# Patient Record
Sex: FEMALE | Race: WHITE | NOT HISPANIC OR LATINO | URBAN - METROPOLITAN AREA
[De-identification: names, ages, dates, MRNs, and addresses within clinical notes are randomized per-mention and may not be internally consistent; named-entity substitution may affect disease eponyms.]

---

## 2020-05-11 ENCOUNTER — TELEPHONE (OUTPATIENT)
Dept: FAMILY MEDICINE | Facility: CLINIC | Age: 60
End: 2020-05-11

## 2020-05-11 NOTE — TELEPHONE ENCOUNTER
----- Message from Alexus Stone sent at 5/11/2020  9:34 AM CDT -----  Contact: [phone]-  Patient is requesting a call back concerning scheduling a New Pt appt for needing a referral to be seen by a Dermatologist. Please call

## 2020-05-11 NOTE — TELEPHONE ENCOUNTER
I spoke with the pt   She is new to [facility] and needs a referral to dermatology      appt scheduled for tomorrow

## 2020-05-12 ENCOUNTER — OFFICE VISIT (OUTPATIENT)
Dept: FAMILY MEDICINE | Facility: CLINIC | Age: 60
End: 2020-05-12
Payer: OTHER GOVERNMENT

## 2020-05-12 VITALS
WEIGHT: 157.19 LBS | BODY MASS INDEX: 24.67 KG/M2 | OXYGEN SATURATION: 100 % | HEART RATE: 120 BPM | HEIGHT: 67 IN | TEMPERATURE: 98 F

## 2020-05-12 DIAGNOSIS — E78.5 HYPERLIPIDEMIA, UNSPECIFIED HYPERLIPIDEMIA TYPE: ICD-10-CM

## 2020-05-12 DIAGNOSIS — R73.9 HYPERGLYCEMIA: ICD-10-CM

## 2020-05-12 DIAGNOSIS — D22.9 NUMEROUS MOLES: ICD-10-CM

## 2020-05-12 DIAGNOSIS — Z00.00 ROUTINE HEALTH MAINTENANCE: Primary | ICD-10-CM

## 2020-05-12 PROCEDURE — 99386 PR PREVENTIVE VISIT,NEW,40-64: ICD-10-PCS | Mod: S$GLB,,, | Performed by: FAMILY MEDICINE

## 2020-05-12 PROCEDURE — 99386 PREV VISIT NEW AGE 40-64: CPT | Mod: S$GLB,,, | Performed by: FAMILY MEDICINE

## 2020-05-12 RX ORDER — BLOOD-GLUCOSE METER
KIT MISCELLANEOUS
COMMUNITY
Start: 2020-04-08

## 2020-05-12 RX ORDER — UBIDECARENONE 30 MG
30 CAPSULE ORAL 3 TIMES DAILY
COMMUNITY

## 2020-05-12 RX ORDER — LANCETS 28 GAUGE
EACH MISCELLANEOUS
COMMUNITY
Start: 2020-04-08

## 2020-05-14 ENCOUNTER — TELEPHONE (OUTPATIENT)
Dept: FAMILY MEDICINE | Facility: CLINIC | Age: 60
End: 2020-05-14

## 2020-05-14 NOTE — TELEPHONE ENCOUNTER
I spoke with the pt    I resent the [facility] referral for derm / dr verde    To retro the approval - jan 1, 2020

## 2020-05-14 NOTE — TELEPHONE ENCOUNTER
----- Message from Shanda Gardner sent at 5/14/2020 10:31 AM CDT -----  Contact: [phone]/ L[place]  Patient's  Griffin is requesting a call back regarding her referral to a dermatologist. Please call him to discuss. Thanks

## 2020-05-17 PROBLEM — Z00.00 ROUTINE HEALTH MAINTENANCE: Status: ACTIVE | Noted: 2020-05-17

## 2020-05-17 PROBLEM — E78.5 HYPERLIPIDEMIA: Status: ACTIVE | Noted: 2020-05-17

## 2020-05-17 PROBLEM — D22.9 NUMEROUS MOLES: Status: ACTIVE | Noted: 2020-05-17

## 2020-05-17 PROBLEM — R73.9 HYPERGLYCEMIA: Status: ACTIVE | Noted: 2020-05-17

## 2020-05-18 NOTE — PROGRESS NOTES
" Patient ID: Kelli Tamayo is a 60 y.o. female.    Chief Complaint: New patient -     HPI       Kelli Tamayo is a 60 y.o. female here for routine examination-annual exam-no new problems.  [place]  Has history of elevated blood sugar but was aggressive with dietary control and weight loss now blood glucose levels are normal.      Review of Symptoms    Constitutional  Neg activity change, No chills/ fever   Resp  Neg hemoptysis, stridor, choking  CVS  Neg chest pain, palpitations    Other review of systems up to 14 normal    Physical Exam    Vitals:    05/12/20 1524   Pulse: (!) 120   Temp: 98.3 °F (36.8 °C)   SpO2: 100%  Comment: manual   Weight: 71.3 kg (157 lb 3 oz)   Height: 5' 7" (1.702 m)        Constitutional:   Oriented to person, place, and time.appears well-developed and well-nourished.   No distress.     HENT:   Head: Normocephalic and atraumatic.     Right Ear: Tympanic membrane, external ear and ear canal normal     Left Ear: Tympanic membrane, external ear and ear canal normal    Nose: External Normal. Normal turbinates, No rhinorrhea     Mouth/Throat: Uvula is midline, oropharynx is clear and moist and mucous membranes are normal.     Neck: supple no anterior cervical adenopathy    Carotid artery:  Bruit    NEG [x]   POS[]    Eyes:   Conjunctivae are normal. Right eye exhibits no discharge. Left eye exhibits no discharge. No scleral icterus. No periorbital edema     Cardiovascular:  Regular rate and rhythm with normal S1 and S2     Pulmonary/Chest:   Clear to auscultation bilaterally without wheezes, rhonchi or rales    Musculoskeletal:  No edema. No obvious deformity No wasting     Neurological:   Alert and oriented to person, place, and time. Coordination normal.     Skin:   Skin is warm and dry.   No diaphoresis.   No rash noted.    Psychiatric: Normal mood and affect. Behavior is normal. Judgment and thought content normal.       Assessment / Plan:      ICD-10-CM ICD-9-CM   1. Routine health " maintenance Z00.00 V70.0   2. Hyperglycemia R73.9 790.29   3. Numerous moles D22.9 216.9   4. Hyperlipidemia, unspecified hyperlipidemia type E78.5 272.4     Routine health maintenance  -     Cancel: Comprehensive metabolic panel; Future; Expected date: 05/12/2020  -     Cancel: Lipid Panel; Future; Expected date: 05/12/2020  -     Cancel: TSH; Future; Expected date: 05/12/2020  -     Cancel: T4, free; Future; Expected date: 05/12/2020  -     Cancel: Hemoglobin A1C; Future; Expected date: 05/12/2020  -     Cancel: CBC auto differential; Future; Expected date: 05/12/2020    Hyperglycemia  -     Ambulatory referral/consult to Internal Medicine; Future; Expected date: 05/19/2020  -     Cancel: Lipid Panel; Future; Expected date: 05/12/2020  -     Cancel: Hemoglobin A1C; Future; Expected date: 05/12/2020  -     Cancel: CBC auto differential; Future; Expected date: 05/12/2020    Numerous moles  -     Ambulatory referral/consult to Dermatology; Future; Expected date: 05/19/2020    Hyperlipidemia, unspecified hyperlipidemia type  -     Ambulatory referral/consult to Internal Medicine; Future; Expected date: 05/19/2020  -     Cancel: Lipid Panel; Future; Expected date: 05/12/2020  -     Cancel: TSH; Future; Expected date: 05/12/2020  -     Cancel: T4, free; Future; Expected date: 05/12/2020  -     Cancel: Hemoglobin A1C; Future; Expected date: 05/12/2020  -     Cancel: CBC auto differential; Future; Expected date: 05/12/2020

## 2020-08-17 PROBLEM — Z00.00 ROUTINE HEALTH MAINTENANCE: Status: RESOLVED | Noted: 2020-05-17 | Resolved: 2020-08-17
